# Patient Record
Sex: FEMALE | Race: WHITE | NOT HISPANIC OR LATINO
[De-identification: names, ages, dates, MRNs, and addresses within clinical notes are randomized per-mention and may not be internally consistent; named-entity substitution may affect disease eponyms.]

---

## 2018-05-02 ENCOUNTER — RECORD ABSTRACTING (OUTPATIENT)
Age: 58
End: 2018-05-02

## 2018-05-02 DIAGNOSIS — Z78.9 OTHER SPECIFIED HEALTH STATUS: ICD-10-CM

## 2018-05-02 DIAGNOSIS — Z87.09 PERSONAL HISTORY OF OTHER DISEASES OF THE RESPIRATORY SYSTEM: ICD-10-CM

## 2018-05-02 DIAGNOSIS — Z86.79 PERSONAL HISTORY OF OTHER DISEASES OF THE CIRCULATORY SYSTEM: ICD-10-CM

## 2018-05-02 DIAGNOSIS — Z82.49 FAMILY HISTORY OF ISCHEMIC HEART DISEASE AND OTHER DISEASES OF THE CIRCULATORY SYSTEM: ICD-10-CM

## 2018-05-04 ENCOUNTER — APPOINTMENT (OUTPATIENT)
Dept: OTOLARYNGOLOGY | Facility: CLINIC | Age: 58
End: 2018-05-04
Payer: COMMERCIAL

## 2018-05-04 VITALS
WEIGHT: 220 LBS | HEART RATE: 99 BPM | DIASTOLIC BLOOD PRESSURE: 90 MMHG | BODY MASS INDEX: 41.54 KG/M2 | HEIGHT: 61 IN | SYSTOLIC BLOOD PRESSURE: 143 MMHG

## 2018-05-04 PROCEDURE — 30200 INJECTION TREATMENT OF NOSE: CPT

## 2018-05-04 PROCEDURE — 99213 OFFICE O/P EST LOW 20 MIN: CPT | Mod: 25

## 2018-06-08 ENCOUNTER — APPOINTMENT (OUTPATIENT)
Dept: OTOLARYNGOLOGY | Facility: CLINIC | Age: 58
End: 2018-06-08
Payer: COMMERCIAL

## 2018-06-08 VITALS
HEART RATE: 80 BPM | DIASTOLIC BLOOD PRESSURE: 80 MMHG | HEIGHT: 61 IN | SYSTOLIC BLOOD PRESSURE: 128 MMHG | BODY MASS INDEX: 41.54 KG/M2 | WEIGHT: 220 LBS

## 2018-06-08 PROCEDURE — 30200 INJECTION TREATMENT OF NOSE: CPT

## 2018-09-07 ENCOUNTER — APPOINTMENT (OUTPATIENT)
Dept: OTOLARYNGOLOGY | Facility: CLINIC | Age: 58
End: 2018-09-07
Payer: COMMERCIAL

## 2018-09-07 VITALS
HEIGHT: 61 IN | SYSTOLIC BLOOD PRESSURE: 131 MMHG | HEART RATE: 78 BPM | WEIGHT: 220 LBS | BODY MASS INDEX: 41.54 KG/M2 | DIASTOLIC BLOOD PRESSURE: 77 MMHG

## 2018-09-07 PROCEDURE — 99213 OFFICE O/P EST LOW 20 MIN: CPT | Mod: 25

## 2018-09-07 PROCEDURE — 30200 INJECTION TREATMENT OF NOSE: CPT

## 2018-12-07 ENCOUNTER — APPOINTMENT (OUTPATIENT)
Dept: OTOLARYNGOLOGY | Facility: CLINIC | Age: 58
End: 2018-12-07
Payer: COMMERCIAL

## 2018-12-07 VITALS
BODY MASS INDEX: 42.48 KG/M2 | HEIGHT: 61 IN | HEART RATE: 86 BPM | DIASTOLIC BLOOD PRESSURE: 82 MMHG | SYSTOLIC BLOOD PRESSURE: 146 MMHG | WEIGHT: 225 LBS

## 2018-12-07 PROCEDURE — 99213 OFFICE O/P EST LOW 20 MIN: CPT | Mod: 25

## 2018-12-07 PROCEDURE — 30200 INJECTION TREATMENT OF NOSE: CPT

## 2019-03-08 ENCOUNTER — APPOINTMENT (OUTPATIENT)
Dept: OTOLARYNGOLOGY | Facility: CLINIC | Age: 59
End: 2019-03-08
Payer: COMMERCIAL

## 2019-03-08 VITALS
WEIGHT: 225 LBS | HEIGHT: 61 IN | BODY MASS INDEX: 42.48 KG/M2 | SYSTOLIC BLOOD PRESSURE: 135 MMHG | HEART RATE: 82 BPM | DIASTOLIC BLOOD PRESSURE: 79 MMHG

## 2019-03-08 PROCEDURE — 30200 INJECTION TREATMENT OF NOSE: CPT

## 2019-03-08 PROCEDURE — 99212 OFFICE O/P EST SF 10 MIN: CPT | Mod: 25

## 2019-03-08 NOTE — PROCEDURE
[FreeTextEntry2] : nasal polyps [FreeTextEntry3] : Indications: bilateral hypertrophy nasal turbinates  bilateral nasal polyps\par Topical Rene-Synephrine and xylocaine 4% applied on cottonoid patties\par Right nasal passage examined  .3 cc triamcinalone 40mg/cc injected into inferior turbinate and nasal polyps 25 g needle\par Left nasal passage examined  .3 cc triamcinalone 40mg/cc injected into inferior turbinate and nasal polyps 25 g needle\par Cotton balls placed in each nasal passage for hemostasis.\par Procedure was well tolerated, patient discharged after observation for 5 minutes.\par triamcinolone NDC # 2150-8359-53\par

## 2019-03-08 NOTE — PHYSICAL EXAM
[Midline] : trachea located in midline position [Normal] : no rashes [de-identified] : extensive polyposis

## 2019-03-08 NOTE — HISTORY OF PRESENT ILLNESS
[de-identified] : co nasl obstruction\par hx extensive nasal polyposis responding to kenalog injection

## 2019-05-03 ENCOUNTER — APPOINTMENT (OUTPATIENT)
Dept: OTOLARYNGOLOGY | Facility: CLINIC | Age: 59
End: 2019-05-03
Payer: COMMERCIAL

## 2019-05-03 VITALS
HEIGHT: 61 IN | BODY MASS INDEX: 41.91 KG/M2 | HEART RATE: 78 BPM | DIASTOLIC BLOOD PRESSURE: 77 MMHG | WEIGHT: 222 LBS | SYSTOLIC BLOOD PRESSURE: 125 MMHG

## 2019-05-03 PROCEDURE — 30200 INJECTION TREATMENT OF NOSE: CPT

## 2019-05-03 PROCEDURE — 99213 OFFICE O/P EST LOW 20 MIN: CPT | Mod: 25

## 2019-05-03 NOTE — PROCEDURE
[FreeTextEntry2] : nasal polyps [FreeTextEntry3] : Indications: bilateral hypertrophy nasal turbinates  bilateral nasal polyps\par Topical Rene-Synephrine and xylocaine 4% applied on cottonoid patties\par Right nasal passage examined  .3 cc triamcinalone 40mg/cc injected into inferior turbinate and nasal polyps 25 g needle\par Left nasal passage examined  .3 cc triamcinalone 40mg/cc injected into inferior turbinate and nasal polyps 25 g needle\par Cotton balls placed in each nasal passage for hemostasis.\par Procedure was well tolerated, patient discharged after observation for 5 minutes.\par triamcinolone NDC # 0880-1987-05\par

## 2019-05-03 NOTE — REVIEW OF SYSTEMS
[Negative] : Heme/Lymph [Patient Intake Form Reviewed] : Patient intake form was reviewed [de-identified] : polyps

## 2019-05-03 NOTE — HISTORY OF PRESENT ILLNESS
[de-identified] : co increased nasal obstructon\par hx extensive nasal polyposis\par responsive to kenalog injection in past

## 2019-05-03 NOTE — PHYSICAL EXAM
[de-identified] : 4 plus left nasal polyposis rt polyp 1 plus [Midline] : trachea located in midline position [Normal] : orientation to person, place, and time: normal

## 2019-07-19 ENCOUNTER — APPOINTMENT (OUTPATIENT)
Dept: OTOLARYNGOLOGY | Facility: CLINIC | Age: 59
End: 2019-07-19
Payer: COMMERCIAL

## 2019-07-19 VITALS
BODY MASS INDEX: 41.91 KG/M2 | SYSTOLIC BLOOD PRESSURE: 117 MMHG | HEART RATE: 82 BPM | WEIGHT: 222 LBS | DIASTOLIC BLOOD PRESSURE: 61 MMHG | HEIGHT: 61 IN

## 2019-07-19 PROCEDURE — 30200 INJECTION TREATMENT OF NOSE: CPT

## 2019-07-19 NOTE — REVIEW OF SYSTEMS
[Negative] : Heme/Lymph [Patient Intake Form Reviewed] : Patient intake form was reviewed [de-identified] : polyps

## 2019-07-19 NOTE — PHYSICAL EXAM
[de-identified] : multiple  bilateral nasal polyps [Midline] : trachea located in midline position [Normal] : no rashes

## 2019-07-19 NOTE — PROCEDURE
[FreeTextEntry2] : nasal polyps [FreeTextEntry3] : Indications: bilateral hypertrophy nasal turbinates  bilateral nasal polyps\par Topical Rene-Synephrine and xylocaine 4% applied on cottonoid patties\par Right nasal passage examined  .3 cc triamcinalone 40mg/cc injected into inferior turbinate and nasal polyps 25 g needle\par Left nasal passage examined  .3 cc triamcinalone 40mg/cc injected into inferior turbinate and nasal polyps 25 g needle\par Cotton balls placed in each nasal passage for hemostasis.\par Procedure was well tolerated, patient discharged after observation for 5 minutes.\par triamcinolone NDC # [id number]\par

## 2019-09-13 ENCOUNTER — APPOINTMENT (OUTPATIENT)
Dept: OTOLARYNGOLOGY | Facility: CLINIC | Age: 59
End: 2019-09-13
Payer: COMMERCIAL

## 2019-09-13 VITALS
SYSTOLIC BLOOD PRESSURE: 133 MMHG | HEIGHT: 61 IN | HEART RATE: 89 BPM | BODY MASS INDEX: 43.05 KG/M2 | WEIGHT: 228 LBS | DIASTOLIC BLOOD PRESSURE: 74 MMHG

## 2019-09-13 PROCEDURE — 99213 OFFICE O/P EST LOW 20 MIN: CPT | Mod: 25

## 2019-09-13 PROCEDURE — 30200 INJECTION TREATMENT OF NOSE: CPT

## 2019-09-13 NOTE — HISTORY OF PRESENT ILLNESS
[de-identified] : co nasal congestion\par hx large nasal polyps\par responds well to Kenalog injections

## 2019-09-13 NOTE — PROCEDURE
[FreeTextEntry2] : bilateral nasal polyps hypertrophic turbinates [FreeTextEntry3] : Indications: bilateral hypertrophy nasal turbinates  bilateral nasal polyps\par Topical Rene-Synephrine and xylocaine 4% applied on cottonoid patties\par Right nasal passage examined  .3 cc triamcinalone 40mg/cc injected into inferior turbinate and nasal polyps 25 g needle\par Left nasal passage examined  .3 cc triamcinalone 40mg/cc injected into inferior turbinate and nasal polyps 25 g needle\par Cotton balls placed in each nasal passage for hemostasis.\par Procedure was well tolerated, patient discharged after observation for 5 minutes.\par triamcinolone NDC # [id number]\par

## 2019-09-13 NOTE — PHYSICAL EXAM
[Midline] : trachea located in midline position [Normal] : no rashes [de-identified] : bilateral 2-3 plus polyps

## 2019-12-06 ENCOUNTER — APPOINTMENT (OUTPATIENT)
Dept: OTOLARYNGOLOGY | Facility: CLINIC | Age: 59
End: 2019-12-06
Payer: COMMERCIAL

## 2019-12-06 VITALS — BODY MASS INDEX: 43.05 KG/M2 | HEIGHT: 61 IN | WEIGHT: 228 LBS

## 2019-12-06 PROCEDURE — 30200 INJECTION TREATMENT OF NOSE: CPT

## 2019-12-06 PROCEDURE — 99213 OFFICE O/P EST LOW 20 MIN: CPT | Mod: 25

## 2019-12-06 NOTE — HISTORY OF PRESENT ILLNESS
[de-identified] : Co nasal congestion\par hx large nasal polyps\par responds well to Kenalog injections \par recent uri

## 2019-12-06 NOTE — PHYSICAL EXAM
[de-identified] : largd biaotreal nasal polyps [Midline] : trachea located in midline position [Normal] : no rashes

## 2019-12-06 NOTE — PROCEDURE
[FreeTextEntry2] : nasal polyps [FreeTextEntry3] : Indications: bilateral hypertrophy nasal turbinates  bilateral nasal polyps\par Topical Rene-Synephrine and xylocaine 4% applied on cottonoid patties\par Right nasal passage examined  .3 cc triamcinalone 40mg/cc injected into inferior turbinate and nasal polyps 25 g needle\par Left nasal passage examined  .3 cc triamcinalone 40mg/cc injected into inferior turbinate and nasal polyps 25 g needle\par Cotton balls placed in each nasal passage for hemostasis.\par Procedure was well tolerated, patient discharged after observation for 5 minutes.\par triamcinolone NDC # 8950-0346-31\par

## 2020-03-06 ENCOUNTER — APPOINTMENT (OUTPATIENT)
Dept: OTOLARYNGOLOGY | Facility: CLINIC | Age: 60
End: 2020-03-06
Payer: COMMERCIAL

## 2020-03-06 VITALS
BODY MASS INDEX: 43.05 KG/M2 | SYSTOLIC BLOOD PRESSURE: 132 MMHG | HEIGHT: 61 IN | DIASTOLIC BLOOD PRESSURE: 78 MMHG | HEART RATE: 85 BPM | WEIGHT: 228 LBS

## 2020-03-06 PROCEDURE — 99213 OFFICE O/P EST LOW 20 MIN: CPT | Mod: 25

## 2020-03-06 PROCEDURE — 30200 INJECTION TREATMENT OF NOSE: CPT

## 2020-03-06 NOTE — PROCEDURE
[FreeTextEntry3] : Indications nasal polyps. \par Procedure Note: Indications: bilateral hypertrophy nasal turbinates bilateral nasal polyps\par Topical Rene-Synephrine and xylocaine 4% applied on cottonoid patties\par Right nasal passage examined.3 cc triamcinalone 40mg/cc injected into inferior turbinate and nasal polyps 25 g needle\par Left nasal passage examined.3 cc triamcinalone 40mg/cc injected into inferior turbinate and nasal polyps 25 g needle\par Cotton balls placed in each nasal passage for hemostasis.\par Procedure was well tolerated, patient discharged after observation for 5 minutes.\par triamcinolone NDC # [id number]\par

## 2020-03-06 NOTE — HISTORY OF PRESENT ILLNESS
[de-identified] : \par Co nasal congestion\par hx large nasal polyps\par responds well to Kenalog injections \par recent uri

## 2020-06-05 ENCOUNTER — APPOINTMENT (OUTPATIENT)
Dept: OTOLARYNGOLOGY | Facility: CLINIC | Age: 60
End: 2020-06-05
Payer: COMMERCIAL

## 2020-06-05 VITALS — TEMPERATURE: 98.5 F | HEIGHT: 61 IN | WEIGHT: 226 LBS | BODY MASS INDEX: 42.67 KG/M2

## 2020-06-05 PROCEDURE — 30200 INJECTION TREATMENT OF NOSE: CPT

## 2020-06-05 PROCEDURE — 99213 OFFICE O/P EST LOW 20 MIN: CPT | Mod: 25

## 2020-06-05 NOTE — ASSESSMENT
[FreeTextEntry1] : allergic rhinitis\par bnasal polyposiskenalog injection bilateral  large left posterior nasal polyps less on rt\par fu 3 mo

## 2020-06-05 NOTE — PROCEDURE
[FreeTextEntry2] : nasal polyps [FreeTextEntry3] : Indications: bilateral hypertrophy nasal turbinates  bilateral nasal polyps\par Topical Rene-Synephrine and xylocaine 4% applied on cottonoid patties\par Right nasal passage examined  .3 cc triamcinalone 40mg/cc injected into inferior turbinate and nasal polyps 25 g needle\par Left nasal passage examined  .3 cc triamcinalone 40mg/cc injected into inferior turbinate and nasal polyps 25 g needle\par Cotton balls placed in each nasal passage for hemostasis.\par Procedure was well tolerated, patient discharged after observation for 5 minutes.\par triamcinolone NDC # [id number]\par

## 2020-09-04 ENCOUNTER — APPOINTMENT (OUTPATIENT)
Dept: OTOLARYNGOLOGY | Facility: CLINIC | Age: 60
End: 2020-09-04
Payer: COMMERCIAL

## 2020-09-04 VITALS — HEIGHT: 61 IN | TEMPERATURE: 98.1 F | WEIGHT: 224 LBS | BODY MASS INDEX: 42.29 KG/M2

## 2020-09-04 PROCEDURE — 99213 OFFICE O/P EST LOW 20 MIN: CPT | Mod: 25

## 2020-09-04 PROCEDURE — 31231 NASAL ENDOSCOPY DX: CPT

## 2020-09-04 PROCEDURE — 30200 INJECTION TREATMENT OF NOSE: CPT

## 2020-09-04 NOTE — PHYSICAL EXAM
Problem: Patient Care Overview  Goal: Plan of Care Review  Outcome: Ongoing (interventions implemented as appropriate)  Patient remains on Vapotherm at 25 LPM 55%, no redness or breakdown noted around nares or ears at this time. Respiratory to follow.       [Nasal Endoscopy Performed] : nasal endoscopy was performed, see procedure section for findings [Midline] : trachea located in midline position [Normal] : no rashes

## 2020-09-04 NOTE — HISTORY OF PRESENT ILLNESS
[de-identified] : Co nasal obstruction\par nasal polyps \par here for kenalog rx \par benefit w last injection

## 2020-09-04 NOTE — ASSESSMENT
[FreeTextEntry1] : allergic rhinitis nasal polyps\par doing well w periodic kenalog injections\par fu 3 mo

## 2020-09-04 NOTE — PROCEDURE
[FreeTextEntry3] : Indications: bilateral hypertrophy nasal turbinates  bilateral nasal polyps\par Topical Rene-Synephrine and xylocaine 4% applied on cottonoid patties\par Right nasal passage examined  .3 cc triamcinalone 40mg/cc injected into inferior turbinate and nasal polyps 25 g needle\par Left nasal passage examined  .3 cc triamcinalone 40mg/cc injected into inferior turbinate and nasal polyps 25 g needle\par Cotton balls placed in each nasal passage for hemostasis.\par Procedure was well tolerated, patient discharged after observation for 5 minutes.\par triamcinolone NDC # [id number]\par  [FreeTextEntry2] : bilateral nasal polyps [FreeTextEntry6] : Indication for procedure:  Unable to adequately examine the nasal passages and sinus drainage with anterior rhinoscopy.\par Scope #\par Moderate septal deviation is present on direct visualization.\par The inferior nasal turbinates are moderate in size. \par The sinus endoscope was introduced into the right nares\par exam right middle meatus reveals 2+ polyposis with moderate inflammation and congestion.  The middle turbinate is edematous.\par The scope was advanced and the sphenoethmoid region was inspected.\par The superior meatus and nasal vault are unremarkable.  The nasopharynx is unremarkable without inflammation or mass\par The sinus endoscope was introduced into the left nares\par exam of the left middle meatus reveals 2+ nasal polyposis with moderate inflammation.  The middle turbinate is edematous.\par The scope was advanced and the sphenoethmoid region was inspected.\par The left superior meatus and nasal vault are unremarkable.\par \par \par

## 2020-12-04 ENCOUNTER — APPOINTMENT (OUTPATIENT)
Dept: OTOLARYNGOLOGY | Facility: CLINIC | Age: 60
End: 2020-12-04
Payer: COMMERCIAL

## 2020-12-04 VITALS — TEMPERATURE: 97.3 F | WEIGHT: 224 LBS | HEIGHT: 60 IN | BODY MASS INDEX: 43.98 KG/M2

## 2020-12-04 PROCEDURE — 99072 ADDL SUPL MATRL&STAF TM PHE: CPT

## 2020-12-04 PROCEDURE — 30200 INJECTION TREATMENT OF NOSE: CPT

## 2020-12-04 PROCEDURE — 99213 OFFICE O/P EST LOW 20 MIN: CPT | Mod: 25

## 2020-12-04 NOTE — HISTORY OF PRESENT ILLNESS
[de-identified] : Co nasal obstruction\par nasal polyps \par here for kenalog rx \par benefit w last injection

## 2020-12-04 NOTE — PHYSICAL EXAM
[Midline] : trachea located in midline position [Normal] : no rashes [de-identified] : bilateral nasal polyps

## 2020-12-04 NOTE — PROCEDURE
[FreeTextEntry2] : nasal polyps hypertrophic turbinates [FreeTextEntry3] : Indications: bilateral hypertrophy nasal turbinates  bilateral nasal polyps\par Topical Rene-Synephrine and xylocaine 4% applied on cottonoid patties\par Right nasal passage examined  .3 cc triamcinalone 40mg/cc injected into inferior turbinate and nasal polyps 25 g needle\par Left nasal passage examined  .3 cc triamcinalone 40mg/cc injected into inferior turbinate and nasal polyps 25 g needle\par Cotton balls placed in each nasal passage for hemostasis.\par Procedure was well tolerated, patient discharged after observation for 5 minutes.\par triamcinolone NDC # [id number]\par

## 2021-03-05 ENCOUNTER — APPOINTMENT (OUTPATIENT)
Dept: OTOLARYNGOLOGY | Facility: CLINIC | Age: 61
End: 2021-03-05
Payer: COMMERCIAL

## 2021-03-05 VITALS — TEMPERATURE: 97.7 F | BODY MASS INDEX: 43.98 KG/M2 | WEIGHT: 224 LBS | HEIGHT: 60 IN

## 2021-03-05 PROCEDURE — 30200 INJECTION TREATMENT OF NOSE: CPT

## 2021-03-05 PROCEDURE — 99072 ADDL SUPL MATRL&STAF TM PHE: CPT

## 2021-03-05 PROCEDURE — 99213 OFFICE O/P EST LOW 20 MIN: CPT | Mod: 25

## 2021-03-05 NOTE — ASSESSMENT
[FreeTextEntry1] : sn loss au aids\par kenalog injection bilateral polyps and turbinates\par fu 3 mo

## 2021-03-05 NOTE — HISTORY OF PRESENT ILLNESS
[de-identified] : Co nasal obstruction\par nasal polyps \par here for kenalog rx \par benefit w last injection

## 2021-03-05 NOTE — PROCEDURE
[FreeTextEntry2] : bilateral nasal polyps [FreeTextEntry3] : Indications: bilateral hypertrophy nasal turbinates  bilateral nasal polyps\par Topical Rene-Synephrine and xylocaine 4% applied on cottonoid patties\par Right nasal passage examined  .3 cc triamcinalone 40mg/cc injected into inferior turbinate and nasal polyps 25 g needle\par Left nasal passage examined  .3 cc triamcinalone 40mg/cc injected into inferior turbinate and nasal polyps 25 g needle\par Cotton balls placed in each nasal passage for hemostasis.\par Procedure was well tolerated, patient discharged after observation for 5 minutes.\par triamcinolone NDC # [id number]\par

## 2021-06-04 ENCOUNTER — APPOINTMENT (OUTPATIENT)
Dept: OTOLARYNGOLOGY | Facility: CLINIC | Age: 61
End: 2021-06-04
Payer: COMMERCIAL

## 2021-06-04 VITALS
HEIGHT: 61 IN | BODY MASS INDEX: 41.54 KG/M2 | SYSTOLIC BLOOD PRESSURE: 132 MMHG | DIASTOLIC BLOOD PRESSURE: 78 MMHG | HEART RATE: 85 BPM | TEMPERATURE: 96.3 F | WEIGHT: 220 LBS

## 2021-06-04 DIAGNOSIS — Z00.00 ENCOUNTER FOR GENERAL ADULT MEDICAL EXAMINATION W/OUT ABNORMAL FINDINGS: ICD-10-CM

## 2021-06-04 PROCEDURE — 99213 OFFICE O/P EST LOW 20 MIN: CPT | Mod: 25

## 2021-06-04 PROCEDURE — 99072 ADDL SUPL MATRL&STAF TM PHE: CPT

## 2021-06-04 PROCEDURE — 30200 INJECTION TREATMENT OF NOSE: CPT

## 2021-06-04 NOTE — PROCEDURE
[FreeTextEntry2] : nasal polyps [FreeTextEntry3] : Indications: bilateral hypertrophy nasal turbinates  bilateral nasal polyps\par Topical Rene-Synephrine and xylocaine 4% applied on cottonoid patties\par Right nasal passage examined  .3 cc triamcinalone 40mg/cc injected into inferior turbinate and nasal polyps 25 g needle\par Left nasal passage examined  .3 cc triamcinalone 40mg/cc injected into inferior turbinate and nasal polyps 25 g needle\par Cotton balls placed in each nasal passage for hemostasis.\par Procedure was well tolerated, patient discharged after observation for 5 minutes.\par triamcinolone NDC # [id number]\par polyps 2 plus

## 2021-06-04 NOTE — HISTORY OF PRESENT ILLNESS
[de-identified] : Co nasal obstruction\par nasal polyps \par here for kenalog rx \par benefit w last injection

## 2021-09-03 ENCOUNTER — APPOINTMENT (OUTPATIENT)
Dept: OTOLARYNGOLOGY | Facility: CLINIC | Age: 61
End: 2021-09-03
Payer: COMMERCIAL

## 2021-09-03 VITALS — HEIGHT: 61 IN | WEIGHT: 220 LBS | TEMPERATURE: 97.8 F | BODY MASS INDEX: 41.54 KG/M2

## 2021-09-03 PROCEDURE — 99213 OFFICE O/P EST LOW 20 MIN: CPT | Mod: 25

## 2021-09-03 PROCEDURE — 30200 INJECTION TREATMENT OF NOSE: CPT

## 2021-09-03 NOTE — PHYSICAL EXAM
[de-identified] : bilateral nasal polyps [Midline] : trachea located in midline position [Normal] : no rashes

## 2021-09-03 NOTE — PROCEDURE
[FreeTextEntry2] : bilateral nasal polyps hypertrophic turbinates [FreeTextEntry3] : Indications: bilateral hypertrophy nasal turbinates  bilateral nasal polyps\par \par Topical Rene-Synephrine and xylocaine 4% applied on cottonoid patties\par Right nasal passage examined  .3 cc triamcinalone 40mg/cc injected into inferior turbinate and nasal polyps 25 g needle\par Left nasal passage examined  .3 cc triamcinalone 40mg/cc injected into inferior turbinate and nasal polyps 25 g needle\par Cotton balls placed in each nasal passage for hemostasis.\par Procedure was well tolerated, patient discharged after observation for 5 minutes.\par triamcinolone NDC # [id number]\par

## 2021-09-03 NOTE — ASSESSMENT
[FreeTextEntry1] : allergic rhinits\par triamcinalone injection  bilateral polyps and turbinates\par fu 3 mo

## 2021-09-03 NOTE — HISTORY OF PRESENT ILLNESS
[de-identified] : Co nasal obstruction\par decreased sense smell\par allergic rhinitis\par nasal polyps \par benefit w last injection

## 2021-12-03 ENCOUNTER — APPOINTMENT (OUTPATIENT)
Dept: OTOLARYNGOLOGY | Facility: CLINIC | Age: 61
End: 2021-12-03
Payer: COMMERCIAL

## 2021-12-03 VITALS — BODY MASS INDEX: 42.29 KG/M2 | TEMPERATURE: 95.4 F | WEIGHT: 224 LBS | HEIGHT: 61 IN

## 2021-12-03 PROCEDURE — 30200 INJECTION TREATMENT OF NOSE: CPT

## 2021-12-03 PROCEDURE — 99213 OFFICE O/P EST LOW 20 MIN: CPT | Mod: 25

## 2021-12-03 PROCEDURE — 31231 NASAL ENDOSCOPY DX: CPT

## 2021-12-03 NOTE — HISTORY OF PRESENT ILLNESS
[de-identified] : fu re nose\par bilateral large nasal polyps\par co decrease sense smell\par has responded well to cortisone injection\par longstanding hearing loss-au aids

## 2021-12-03 NOTE — PROCEDURE
[FreeTextEntry2] : bilateral nasal polyposis hypertrophic turbinates [FreeTextEntry3] : Indications: bilateral hypertrophy nasal turbinates  bilateral nasal polyps\par \par Topical Erne-Synephrine and xylocaine 4% applied on cottonoid patties\par Right nasal passage examined  .3 cc triamcinalone 40mg/cc injected into inferior turbinate and nasal polyps 25 g needle\par Left nasal passage examined  .3 cc triamcinalone 40mg/cc injected into inferior turbinate and nasal polyps 25 g needle\par Cotton balls placed in each nasal passage for hemostasis.\par Procedure was well tolerated, patient discharged after observation for 5 minutes.\par triamcinolone NDC # 5464-3044-87\par  [FreeTextEntry6] : Indication for procedure:  Unable to adequately examine the nasal passages and sinus drainage with anterior rhinoscopy.\par Scope #\par The patient has \par \par Moderate septal deviation is present on direct visualization.\par The inferior nasal turbinates are moderate in size. \par The sinus endoscope was introduced into the right nares\par exam right middle meatus reveals 2+ polyposis with moderate inflammation and congestion.  The middle turbinate is edematous.\par The scope was advanced and the sphenoethmoid region was inspected.\par The superior meatus and nasal vault are unremarkable.  The nasopharynx is unremarkable without inflammation or mass\par The sinus endoscope was introduced into the left nares\par exam of the left middle meatus reveals 2+ nasal polyposis with moderate inflammation.  The middle turbinate is edematous.\par The scope was advanced and the sphenoethmoid region was inspected.\par The left superior meatus and nasal vault are unremarkable.\par \par \par \par

## 2022-03-04 ENCOUNTER — APPOINTMENT (OUTPATIENT)
Dept: OTOLARYNGOLOGY | Facility: CLINIC | Age: 62
End: 2022-03-04
Payer: COMMERCIAL

## 2022-03-04 VITALS — WEIGHT: 222 LBS | HEIGHT: 61 IN | TEMPERATURE: 97.3 F | BODY MASS INDEX: 41.91 KG/M2

## 2022-03-04 PROCEDURE — 30200 INJECTION TREATMENT OF NOSE: CPT

## 2022-03-04 PROCEDURE — 99213 OFFICE O/P EST LOW 20 MIN: CPT | Mod: 25

## 2022-05-20 NOTE — PHYSICAL EXAM
[Midline] : trachea located in midline position [Normal] : no rashes [de-identified] : bilateral bnasal polyps

## 2022-05-20 NOTE — PROCEDURE
[FreeTextEntry2] : nasal polyps [FreeTextEntry3] : Indications: bilateral hypertrophy nasal turbinates  bilateral nasal polyps\par \par Topical Rene-Synephrine and xylocaine 4% applied on cottonoid patties\par Right nasal passage examined  .3 cc triamcinalone 40mg/cc injected into inferior turbinate and nasal polyps 25 g needle\par Left nasal passage examined  .3 cc triamcinalone 40mg/cc injected into inferior turbinate and nasal polyps 25 g needle\par Cotton balls placed in each nasal passage for hemostasis.\par Procedure was well tolerated, patient discharged after observation for 5 minutes.\par NDC [id number]\par \par Lot # [id number]\par Expir 3/23\par \par triamcinolone NDC # 3411-9470-64\par

## 2022-05-20 NOTE — HISTORY OF PRESENT ILLNESS
[de-identified] : longstanding nasal polyps\par co  anosmia and congestion\par Fu re nose\par bilateral large nasal polyps\par co decrease sense smell\par has responded well to cortisone injection\par longstanding hearing loss-au aids

## 2022-06-03 ENCOUNTER — APPOINTMENT (OUTPATIENT)
Dept: OTOLARYNGOLOGY | Facility: CLINIC | Age: 62
End: 2022-06-03

## 2022-06-03 VITALS — BODY MASS INDEX: 42.29 KG/M2 | WEIGHT: 224 LBS | HEIGHT: 61 IN | TEMPERATURE: 97.6 F

## 2022-06-03 PROCEDURE — 99213 OFFICE O/P EST LOW 20 MIN: CPT | Mod: 25

## 2022-06-03 PROCEDURE — 31231 NASAL ENDOSCOPY DX: CPT

## 2022-06-03 PROCEDURE — 30200 INJECTION TREATMENT OF NOSE: CPT | Mod: 1L,50

## 2022-06-03 RX ORDER — DULAGLUTIDE 0.75 MG/.5ML
0.75 INJECTION, SOLUTION SUBCUTANEOUS
Qty: 6 | Refills: 0 | Status: ACTIVE | COMMUNITY
Start: 2022-01-20

## 2022-06-03 RX ORDER — INSULIN GLARGINE-YFGN 100 [IU]/ML
100 INJECTION, SOLUTION SUBCUTANEOUS
Qty: 30 | Refills: 0 | Status: ACTIVE | COMMUNITY
Start: 2022-04-20

## 2022-06-03 RX ORDER — REPAGLINIDE 2 MG/1
2 TABLET ORAL
Qty: 540 | Refills: 0 | Status: ACTIVE | COMMUNITY
Start: 2022-04-20

## 2022-06-03 RX ORDER — MONTELUKAST 10 MG/1
10 TABLET, FILM COATED ORAL
Qty: 90 | Refills: 0 | Status: ACTIVE | COMMUNITY
Start: 2022-01-20

## 2022-06-03 RX ORDER — UMECLIDINIUM 62.5 UG/1
62.5 AEROSOL, POWDER ORAL
Qty: 90 | Refills: 0 | Status: ACTIVE | COMMUNITY
Start: 2022-04-07

## 2022-06-03 RX ORDER — SITAGLIPTIN 100 MG/1
100 TABLET, FILM COATED ORAL
Qty: 90 | Refills: 0 | Status: ACTIVE | COMMUNITY
Start: 2022-01-20

## 2022-06-03 RX ORDER — SYRINGE AND NEEDLE,INSULIN,1ML 31 GX5/16"
31G X 5/16" SYRINGE, EMPTY DISPOSABLE MISCELLANEOUS
Qty: 90 | Refills: 0 | Status: ACTIVE | COMMUNITY
Start: 2022-01-20

## 2022-06-03 RX ORDER — EZETIMIBE 10 MG/1
10 TABLET ORAL
Qty: 90 | Refills: 0 | Status: ACTIVE | COMMUNITY
Start: 2022-01-20

## 2022-06-03 RX ORDER — BUDESONIDE AND FORMOTEROL FUMARATE DIHYDRATE 160; 4.5 UG/1; UG/1
160-4.5 AEROSOL RESPIRATORY (INHALATION)
Qty: 30 | Refills: 0 | Status: ACTIVE | COMMUNITY
Start: 2022-01-10

## 2022-06-03 RX ORDER — GLIPIZIDE 5 MG/1
5 TABLET, FILM COATED, EXTENDED RELEASE ORAL
Qty: 90 | Refills: 0 | Status: ACTIVE | COMMUNITY
Start: 2022-01-20

## 2022-06-03 RX ORDER — LISINOPRIL 20 MG/1
20 TABLET ORAL
Qty: 90 | Refills: 0 | Status: ACTIVE | COMMUNITY
Start: 2022-01-20

## 2022-06-03 RX ORDER — FOLIC ACID 1 MG/1
1 TABLET ORAL
Qty: 90 | Refills: 0 | Status: ACTIVE | COMMUNITY
Start: 2022-01-20

## 2022-06-03 RX ORDER — ALBUTEROL SULFATE 90 UG/1
108 (90 BASE) INHALANT RESPIRATORY (INHALATION)
Qty: 25 | Refills: 0 | Status: ACTIVE | COMMUNITY
Start: 2022-04-07

## 2022-06-03 RX ORDER — SIMVASTATIN 80 MG/1
80 TABLET, FILM COATED ORAL
Qty: 90 | Refills: 0 | Status: ACTIVE | COMMUNITY
Start: 2022-01-20

## 2022-06-03 RX ORDER — METFORMIN HYDROCHLORIDE 1000 MG/1
1000 TABLET, COATED ORAL
Qty: 180 | Refills: 0 | Status: ACTIVE | COMMUNITY
Start: 2022-04-20

## 2022-06-03 NOTE — HISTORY OF PRESENT ILLNESS
[de-identified] : co nasal obstruction \par reduced sense smell\par \par Longstanding nasal polyps\par co anosmia and congestion\par Fu re nose\par bilateral large nasal polyps\par co decrease sense smell\par has responded well to cortisone injection\par longstanding hearing loss-au aids \par

## 2022-06-03 NOTE — PROCEDURE
[FreeTextEntry2] : nasal polyps hypertrophic turbinates [FreeTextEntry3] : Indications: bilateral hypertrophy nasal turbinates  bilateral nasal polyps\par \par Topical Rene-Synephrine and xylocaine 4% applied on cottonoid patties\par Right nasal passage examined  .3 cc triamcinalone 40mg/cc injected into inferior turbinate and nasal polyps 25 g needle\par Left nasal passage examined  .3 cc triamcinalone 40mg/cc injected into inferior turbinate and nasal polyps 25 g needle\par Cotton balls placed in each nasal passage for hemostasis.\par Procedure was well tolerated, patient discharged after observation for 5 minutes.\par triamcinolone NDC # 4275-0634-00\par Lot [id number]\par Exp Mar 2023\par \par  [FreeTextEntry6] : Indication for procedure:  Unable to adequately examine the nasal passages and sinus drainage with anterior rhinoscopy.\par Scope #\par The patient has \par \par Moderate septal deviation is present on direct visualization.\par The inferior nasal turbinates are moderate in size. \par The sinus endoscope was introduced into the right nares\par exam right middle meatus reveals 1-2+ polyposis with moderate inflammation and congestion.  The middle turbinate is edematous.\par The scope was advanced and the sphenoethmoid region was inspected.\par The superior meatus and nasal vault are unremarkable.  The nasopharynx is unremarkable without inflammation or mass\par The sinus endoscope was introduced into the left nares\par exam of the left middle meatus reveals 5+ nasal polyposis with moderate inflammation.  The middle turbinate is edematous.\par The scope was advanced and the sphenoethmoid region was inspected.\par The left superior meatus and nasal vault are unremarkable.\par \par \par

## 2022-06-03 NOTE — ASSESSMENT
[FreeTextEntry1] : triamcinolone injection bilateral nasal polyps and turbinates\par anosmia\par allergic rhinitis\par fu 3 mo\par

## 2022-06-17 ENCOUNTER — TRANSCRIPTION ENCOUNTER (OUTPATIENT)
Age: 62
End: 2022-06-17

## 2022-09-06 ENCOUNTER — APPOINTMENT (OUTPATIENT)
Dept: OTOLARYNGOLOGY | Facility: CLINIC | Age: 62
End: 2022-09-06

## 2022-09-06 VITALS — WEIGHT: 224 LBS | BODY MASS INDEX: 42.29 KG/M2 | HEIGHT: 61 IN | TEMPERATURE: 97.3 F

## 2022-09-06 PROCEDURE — 31231 NASAL ENDOSCOPY DX: CPT

## 2022-09-06 PROCEDURE — 99213 OFFICE O/P EST LOW 20 MIN: CPT | Mod: 25

## 2022-09-06 NOTE — ASSESSMENT
[FreeTextEntry1] : sn loss using au aids\par nasal polyposis decrease siae of polyps from last visit\par will hold off on additional injectin of polyps for now\par fu 3 mo

## 2022-09-06 NOTE — HISTORY OF PRESENT ILLNESS
[de-identified] : Co nasal obstruction \par reduced sense smell\par \par Longstanding nasal polyps\par co anosmia and congestion\par Fu re nose\par bilateral large nasal polyps\par co decrease sense smell better w lat triamcinalone injection\par has responded well to cortisone injection\par longstanding hearing loss-au aids \par

## 2022-09-06 NOTE — PROCEDURE
[FreeTextEntry6] : Indication for procedure:  Unable to adequately examine the nasal passages and sinus drainage with anterior rhinoscopy.\par Scope #\par The patient has \par \par Moderate septal deviation is present on direct visualization.\par The inferior nasal turbinates are moderate in size. \par The sinus endoscope was introduced into the right nares\par exam right middle meatus reveals 2+ polyposis with moderate inflammation and congestion.  The middle turbinate is edematous.\par The scope was advanced and the sphenoethmoid region was inspected.\par The superior meatus and nasal vault are unremarkable.  The nasopharynx is unremarkable without inflammation or mass\par The sinus endoscope was introduced into the left nares\par exam of the left middle meatus reveals 2+ nasal polyposis with moderate inflammation.  The middle turbinate is edematous.\par The scope was advanced and the sphenoethmoid region was inspected.\par The left superior meatus and nasal vault are unremarkable.\par \par

## 2022-12-09 ENCOUNTER — APPOINTMENT (OUTPATIENT)
Dept: OTOLARYNGOLOGY | Facility: CLINIC | Age: 62
End: 2022-12-09

## 2022-12-09 VITALS — BODY MASS INDEX: 41.72 KG/M2 | HEIGHT: 61 IN | TEMPERATURE: 96.7 F | WEIGHT: 221 LBS

## 2022-12-09 PROCEDURE — 31231 NASAL ENDOSCOPY DX: CPT

## 2022-12-09 PROCEDURE — 99213 OFFICE O/P EST LOW 20 MIN: CPT | Mod: 25

## 2022-12-09 RX ORDER — GLIPIZIDE 10 MG/1
10 TABLET, FILM COATED, EXTENDED RELEASE ORAL
Qty: 90 | Refills: 0 | Status: ACTIVE | COMMUNITY
Start: 2022-11-28

## 2022-12-09 RX ORDER — DULAGLUTIDE 3 MG/.5ML
3 INJECTION, SOLUTION SUBCUTANEOUS
Qty: 6 | Refills: 0 | Status: ACTIVE | COMMUNITY
Start: 2022-10-24

## 2022-12-09 RX ORDER — DULAGLUTIDE 1.5 MG/.5ML
1.5 INJECTION, SOLUTION SUBCUTANEOUS
Qty: 6 | Refills: 0 | Status: ACTIVE | COMMUNITY
Start: 2022-07-15

## 2022-12-09 NOTE — HISTORY OF PRESENT ILLNESS
[de-identified] : fu nasal polyps\par good response to triamcinolone injections  in past\par no anosmia\par some left sided congestion no mucopus

## 2022-12-09 NOTE — ASSESSMENT
[FreeTextEntry1] : nasal polyposis\par chronic sinusitis\par good control now\par no need for injection\par rec fluticasone spray\par fu 3 mo\par  20 minutes spent with patient with exam and counseling excluding time for any procedure.\par

## 2022-12-09 NOTE — PROCEDURE
[FreeTextEntry6] : Indication for procedure:  Unable to adequately examine the nasal passages and sinus drainage with anterior rhinoscopy.\par Scope #\par The patient has \par \par Moderate septal deviation is present on direct visualization.\par The inferior nasal turbinates are moderate in size. \par The sinus endoscope was introduced into the right nares\par exam right middle meatus reveals 1+ polyposis with moderate inflammation and congestion.  The middle turbinate is edematous.\par The scope was advanced and the sphenoethmoid region was inspected.\par The superior meatus and nasal vault are unremarkable.  The nasopharynx is unremarkable without inflammation or mass\par The sinus endoscope was introduced into the left nares\par exam of the left middle meatus reveals 2+ nasal polyposis with moderate inflammation.  The middle turbinate is edematous.\par The scope was advanced and the sphenoethmoid region was inspected.\par The left superior meatus and nasal vault are unremarkable.\par \par

## 2022-12-19 RX ORDER — FLUTICASONE PROPIONATE 50 UG/1
50 SPRAY, METERED NASAL
Qty: 1 | Refills: 3 | Status: ACTIVE | COMMUNITY
Start: 2022-12-09 | End: 1900-01-01

## 2023-02-20 ENCOUNTER — APPOINTMENT (OUTPATIENT)
Dept: OTOLARYNGOLOGY | Facility: CLINIC | Age: 63
End: 2023-02-20
Payer: COMMERCIAL

## 2023-02-20 VITALS — WEIGHT: 222 LBS | HEIGHT: 61 IN | TEMPERATURE: 96.8 F | BODY MASS INDEX: 41.91 KG/M2

## 2023-02-20 DIAGNOSIS — J45.901 UNSPECIFIED ASTHMA WITH (ACUTE) EXACERBATION: ICD-10-CM

## 2023-02-20 DIAGNOSIS — J34.3 HYPERTROPHY OF NASAL TURBINATES: ICD-10-CM

## 2023-02-20 PROCEDURE — 99213 OFFICE O/P EST LOW 20 MIN: CPT

## 2023-02-20 NOTE — PHYSICAL EXAM
[de-identified] : rt side 1 plus polyps left 2 plus no mucopus [Midline] : trachea located in midline position [Normal] : no rashes

## 2023-02-20 NOTE — HISTORY OF PRESENT ILLNESS
[de-identified] : fu extensive nasal polyosis\par have used tiramcinalone injectins in past\par currently sense of smell adequate

## 2023-02-20 NOTE — ASSESSMENT
[FreeTextEntry1] : stable  nasal polyps without significant obstruction\par fu 4  months\par daily fluticasone\par  20 minutes spent with patient with exam and counseling excluding time for any procedure.\par

## 2023-07-10 ENCOUNTER — APPOINTMENT (OUTPATIENT)
Dept: OTOLARYNGOLOGY | Facility: CLINIC | Age: 63
End: 2023-07-10
Payer: MEDICARE

## 2023-07-10 VITALS — BODY MASS INDEX: 41.54 KG/M2 | WEIGHT: 220 LBS | HEIGHT: 61 IN | TEMPERATURE: 97.1 F

## 2023-07-10 PROCEDURE — 99213 OFFICE O/P EST LOW 20 MIN: CPT

## 2023-07-10 NOTE — HISTORY OF PRESENT ILLNESS
[de-identified] : Fu extensive nasal polyosis\par have used tiramcinalone injectins in past\par currently sense of smell adequate \par polyp size has been stable past 2 visits

## 2023-07-10 NOTE — ASSESSMENT
[FreeTextEntry1] : much reduced left nasal polyps\par allergic rhinits\par continue montelukast  and fluticasone\par fu 6 mo\par  20 minutes spent with patient with exam and counseling excluding time for any procedure.\par

## 2024-01-08 ENCOUNTER — APPOINTMENT (OUTPATIENT)
Dept: OTOLARYNGOLOGY | Facility: CLINIC | Age: 64
End: 2024-01-08
Payer: MEDICARE

## 2024-01-08 VITALS — WEIGHT: 217 LBS | BODY MASS INDEX: 40.97 KG/M2 | HEIGHT: 61 IN

## 2024-01-08 DIAGNOSIS — R43.0 ANOSMIA: ICD-10-CM

## 2024-01-08 DIAGNOSIS — J30.9 ALLERGIC RHINITIS, UNSPECIFIED: ICD-10-CM

## 2024-01-08 DIAGNOSIS — J33.9 NASAL POLYP, UNSPECIFIED: ICD-10-CM

## 2024-01-08 DIAGNOSIS — H90.3 SENSORINEURAL HEARING LOSS, BILATERAL: ICD-10-CM

## 2024-01-08 PROCEDURE — 31231 NASAL ENDOSCOPY DX: CPT

## 2024-01-08 PROCEDURE — 99213 OFFICE O/P EST LOW 20 MIN: CPT | Mod: 25

## 2024-01-08 NOTE — PROCEDURE
[FreeTextEntry6] : Indication for procedure:  Unable to adequately examine the nasal passages and sinus drainage with anterior rhinoscopy. Scope # 47 The patient has bilateral nasal polyps  Moderate septal deviation is present on direct visualization. The inferior nasal turbinates are moderate in size.  The sinus endoscope was introduced into the right nares exam right middle meatus reveals 2+ polyposis with moderate inflammation and congestion.  The middle turbinate is edematous. The scope was advanced and the sphenoethmoid region was inspected. The superior meatus and nasal vault are unremarkable.  The nasopharynx is unremarkable without inflammation or mass The sinus endoscope was introduced into the left nares exam of the left middle meatus reveals 2+ nasal polyposis with moderate inflammation.  The middle turbinate is edematous. The scope was advanced and the sphenoethmoid region was inspected. The left superior meatus and nasal vault are unremarkable.

## 2024-01-08 NOTE — ASSESSMENT
[FreeTextEntry1] : bilateral nasal polyps better re anosmia allergic rhinitis  sn loss continue daily fluticasone fu 6 mo

## 2024-01-08 NOTE — HISTORY OF PRESENT ILLNESS
[de-identified] :      Fu extensive nasal polyosis  have used tiramcinalone injectins in past  currently sense of smell adequate  polyp size has been stable past 2 visits

## 2024-04-10 ENCOUNTER — NON-APPOINTMENT (OUTPATIENT)
Age: 64
End: 2024-04-10

## 2024-07-10 ENCOUNTER — APPOINTMENT (OUTPATIENT)
Dept: OTOLARYNGOLOGY | Facility: CLINIC | Age: 64
End: 2024-07-10
Payer: MEDICARE

## 2024-07-10 VITALS — HEIGHT: 61 IN | BODY MASS INDEX: 40.97 KG/M2 | WEIGHT: 217 LBS

## 2024-07-10 DIAGNOSIS — R43.0 ANOSMIA: ICD-10-CM

## 2024-07-10 DIAGNOSIS — H90.3 SENSORINEURAL HEARING LOSS, BILATERAL: ICD-10-CM

## 2024-07-10 DIAGNOSIS — J30.9 ALLERGIC RHINITIS, UNSPECIFIED: ICD-10-CM

## 2024-07-10 DIAGNOSIS — J33.9 NASAL POLYP, UNSPECIFIED: ICD-10-CM

## 2024-07-10 PROCEDURE — 99213 OFFICE O/P EST LOW 20 MIN: CPT | Mod: 25

## 2024-07-10 PROCEDURE — 31231 NASAL ENDOSCOPY DX: CPT

## 2024-12-09 ENCOUNTER — APPOINTMENT (OUTPATIENT)
Dept: OTOLARYNGOLOGY | Facility: CLINIC | Age: 64
End: 2024-12-09
Payer: MEDICARE

## 2024-12-09 ENCOUNTER — NON-APPOINTMENT (OUTPATIENT)
Age: 64
End: 2024-12-09

## 2024-12-09 VITALS — WEIGHT: 214 LBS | BODY MASS INDEX: 40.4 KG/M2 | HEIGHT: 61 IN

## 2024-12-09 DIAGNOSIS — J33.9 NASAL POLYP, UNSPECIFIED: ICD-10-CM

## 2024-12-09 DIAGNOSIS — J34.3 HYPERTROPHY OF NASAL TURBINATES: ICD-10-CM

## 2024-12-09 DIAGNOSIS — J30.9 ALLERGIC RHINITIS, UNSPECIFIED: ICD-10-CM

## 2024-12-09 DIAGNOSIS — H90.3 SENSORINEURAL HEARING LOSS, BILATERAL: ICD-10-CM

## 2024-12-09 PROCEDURE — 31231 NASAL ENDOSCOPY DX: CPT

## 2024-12-09 PROCEDURE — 30200 INJECTION TREATMENT OF NOSE: CPT

## 2024-12-09 PROCEDURE — 99213 OFFICE O/P EST LOW 20 MIN: CPT | Mod: 25

## 2025-06-25 ENCOUNTER — NON-APPOINTMENT (OUTPATIENT)
Age: 65
End: 2025-06-25

## 2025-06-25 ENCOUNTER — APPOINTMENT (OUTPATIENT)
Dept: OTOLARYNGOLOGY | Facility: CLINIC | Age: 65
End: 2025-06-25
Payer: MEDICARE

## 2025-06-25 VITALS — HEIGHT: 61 IN | BODY MASS INDEX: 39.46 KG/M2 | WEIGHT: 209 LBS

## 2025-06-25 VITALS — HEART RATE: 106 BPM | SYSTOLIC BLOOD PRESSURE: 120 MMHG | DIASTOLIC BLOOD PRESSURE: 74 MMHG

## 2025-06-25 PROCEDURE — 31231 NASAL ENDOSCOPY DX: CPT

## 2025-06-25 PROCEDURE — 99213 OFFICE O/P EST LOW 20 MIN: CPT | Mod: 25
